# Patient Record
Sex: MALE | Race: WHITE | NOT HISPANIC OR LATINO | Employment: OTHER | URBAN - METROPOLITAN AREA
[De-identification: names, ages, dates, MRNs, and addresses within clinical notes are randomized per-mention and may not be internally consistent; named-entity substitution may affect disease eponyms.]

---

## 2023-03-17 PROBLEM — M25.552 HIP PAIN, LEFT: Status: RESOLVED | Noted: 2023-03-17 | Resolved: 2023-03-17

## 2023-03-17 PROBLEM — R00.1 SINUS BRADYCARDIA: Status: ACTIVE | Noted: 2023-03-17

## 2023-03-17 PROBLEM — F02.80 FRONTOTEMPORAL DEMENTIA (MULTI): Status: ACTIVE | Noted: 2023-03-17

## 2023-03-17 PROBLEM — F42.9 OCD (OBSESSIVE COMPULSIVE DISORDER): Status: ACTIVE | Noted: 2023-03-17

## 2023-03-17 PROBLEM — Q80.9 XERODERMA: Status: ACTIVE | Noted: 2023-03-17

## 2023-03-17 PROBLEM — G57.12 MERALGIA PARESTHETICA OF LEFT SIDE: Status: ACTIVE | Noted: 2023-03-17

## 2023-03-17 PROBLEM — L20.9 ATOPIC DERMATITIS: Status: ACTIVE | Noted: 2023-03-17

## 2023-03-17 PROBLEM — L73.9 FOLLICULITIS: Status: RESOLVED | Noted: 2023-03-17 | Resolved: 2023-03-17

## 2023-03-17 PROBLEM — E55.9 MILD VITAMIN D DEFICIENCY: Status: ACTIVE | Noted: 2023-03-17

## 2023-03-17 PROBLEM — H90.3 SENSORINEURAL HEARING LOSS, BILATERAL: Status: ACTIVE | Noted: 2023-03-17

## 2023-03-17 PROBLEM — H91.90 HEARING LOSS: Status: ACTIVE | Noted: 2023-03-17

## 2023-03-17 PROBLEM — A09 TRAVELERS' DIARRHEA: Status: RESOLVED | Noted: 2023-03-17 | Resolved: 2023-03-17

## 2023-03-17 PROBLEM — R19.5 ABNORMAL STOOL TEST: Status: ACTIVE | Noted: 2023-03-17

## 2023-03-17 PROBLEM — M85.80 OSTEOPENIA: Status: ACTIVE | Noted: 2023-03-17

## 2023-03-17 PROBLEM — G31.09 FRONTOTEMPORAL DEMENTIA (MULTI): Status: ACTIVE | Noted: 2023-03-17

## 2023-03-17 PROBLEM — E78.89 OTHER LIPOPROTEIN METABOLISM DISORDERS: Status: ACTIVE | Noted: 2023-03-17

## 2023-03-17 PROBLEM — K63.5 POLYP OF COLON: Status: ACTIVE | Noted: 2023-03-17

## 2023-03-17 PROBLEM — G31.84 MILD COGNITIVE IMPAIRMENT: Status: ACTIVE | Noted: 2023-03-17

## 2023-03-17 PROBLEM — R93.1 AGATSTON CORONARY ARTERY CALCIUM SCORE LESS THAN 100: Status: ACTIVE | Noted: 2023-03-17

## 2023-03-17 PROBLEM — L57.0 KERATOSIS: Status: ACTIVE | Noted: 2023-03-17

## 2023-03-17 PROBLEM — F22: Status: ACTIVE | Noted: 2023-03-17

## 2023-03-17 PROBLEM — E03.8 SUBCLINICAL HYPOTHYROIDISM: Status: ACTIVE | Noted: 2023-03-17

## 2023-03-17 PROBLEM — N40.0 BPH (BENIGN PROSTATIC HYPERPLASIA): Status: ACTIVE | Noted: 2023-03-17

## 2023-03-17 PROBLEM — R19.7 DIARRHEA: Status: RESOLVED | Noted: 2023-03-17 | Resolved: 2023-03-17

## 2023-03-17 PROBLEM — H93.13 BILATERAL TINNITUS: Status: ACTIVE | Noted: 2023-03-17

## 2023-03-17 PROBLEM — K52.9 GASTROENTERITIS: Status: RESOLVED | Noted: 2023-03-17 | Resolved: 2023-03-17

## 2023-03-17 PROBLEM — T69.1XXA CHILBLAINS: Status: ACTIVE | Noted: 2023-03-17

## 2023-03-17 PROBLEM — R53.83 FATIGUE: Status: ACTIVE | Noted: 2023-03-17

## 2023-03-17 PROBLEM — D17.1 LIPOMA OF BACK: Status: ACTIVE | Noted: 2023-03-17

## 2023-03-17 PROBLEM — M25.511 RIGHT SHOULDER PAIN: Status: RESOLVED | Noted: 2023-03-17 | Resolved: 2023-03-17

## 2023-03-17 PROBLEM — E78.5 HYPERLIPIDEMIA, MILD: Status: ACTIVE | Noted: 2023-03-17

## 2023-03-17 PROBLEM — R82.90 ABNORMAL URINALYSIS: Status: ACTIVE | Noted: 2023-03-17

## 2023-03-17 RX ORDER — CHOLECALCIFEROL (VITAMIN D3) 50 MCG
2000 TABLET ORAL DAILY
COMMUNITY
Start: 2018-02-27

## 2023-04-06 DIAGNOSIS — E78.5 HYPERLIPIDEMIA, MILD: ICD-10-CM

## 2023-04-06 DIAGNOSIS — E55.9 MILD VITAMIN D DEFICIENCY: ICD-10-CM

## 2023-04-06 DIAGNOSIS — Z12.5 SCREENING PSA (PROSTATE SPECIFIC ANTIGEN): ICD-10-CM

## 2023-04-06 DIAGNOSIS — E03.8 SUBCLINICAL HYPOTHYROIDISM: ICD-10-CM

## 2023-04-06 DIAGNOSIS — R82.90 ABNORMAL URINALYSIS: ICD-10-CM

## 2023-04-06 PROBLEM — M75.101 ROTATOR CUFF SYNDROME, RIGHT: Status: ACTIVE | Noted: 2023-04-06

## 2023-04-06 PROBLEM — G31.84 MILD COGNITIVE IMPAIRMENT: Status: RESOLVED | Noted: 2023-04-06 | Resolved: 2023-04-06

## 2023-04-06 RX ORDER — FLUVOXAMINE MALEATE 100 MG/1
100 TABLET, COATED ORAL 2 TIMES DAILY
COMMUNITY
Start: 2022-12-19 | End: 2023-04-26 | Stop reason: SDUPTHER

## 2023-04-11 ENCOUNTER — LAB (OUTPATIENT)
Dept: LAB | Facility: LAB | Age: 78
End: 2023-04-11
Payer: MEDICARE

## 2023-04-11 DIAGNOSIS — E55.9 MILD VITAMIN D DEFICIENCY: ICD-10-CM

## 2023-04-11 DIAGNOSIS — R82.90 ABNORMAL URINALYSIS: ICD-10-CM

## 2023-04-11 DIAGNOSIS — Z12.5 SCREENING PSA (PROSTATE SPECIFIC ANTIGEN): ICD-10-CM

## 2023-04-11 DIAGNOSIS — E03.8 SUBCLINICAL HYPOTHYROIDISM: ICD-10-CM

## 2023-04-11 DIAGNOSIS — E78.5 HYPERLIPIDEMIA, MILD: ICD-10-CM

## 2023-04-11 LAB
ALANINE AMINOTRANSFERASE (SGPT) (U/L) IN SER/PLAS: 12 U/L (ref 10–52)
ALBUMIN (G/DL) IN SER/PLAS: 4.1 G/DL (ref 3.4–5)
ANION GAP IN SER/PLAS: 14 MMOL/L (ref 10–20)
APPEARANCE, URINE: CLEAR
ASPARTATE AMINOTRANSFERASE (SGOT) (U/L) IN SER/PLAS: 16 U/L (ref 9–39)
BASOPHILS (10*3/UL) IN BLOOD BY AUTOMATED COUNT: 0.03 X10E9/L (ref 0–0.1)
BASOPHILS/100 LEUKOCYTES IN BLOOD BY AUTOMATED COUNT: 0.6 % (ref 0–2)
BILIRUBIN, URINE: NEGATIVE
BLOOD, URINE: NEGATIVE
CALCIUM (MG/DL) IN SER/PLAS: 8.8 MG/DL (ref 8.6–10.3)
CARBON DIOXIDE, TOTAL (MMOL/L) IN SER/PLAS: 27 MMOL/L (ref 21–32)
CHLORIDE (MMOL/L) IN SER/PLAS: 100 MMOL/L (ref 98–107)
CHOLESTEROL (MG/DL) IN SER/PLAS: 190 MG/DL (ref 0–199)
CHOLESTEROL IN HDL (MG/DL) IN SER/PLAS: 76.8 MG/DL
CHOLESTEROL/HDL RATIO: 2.5
COLOR, URINE: YELLOW
CREATININE (MG/DL) IN SER/PLAS: 1.04 MG/DL (ref 0.5–1.3)
EOSINOPHILS (10*3/UL) IN BLOOD BY AUTOMATED COUNT: 0.2 X10E9/L (ref 0–0.4)
EOSINOPHILS/100 LEUKOCYTES IN BLOOD BY AUTOMATED COUNT: 3.7 % (ref 0–6)
ERYTHROCYTE DISTRIBUTION WIDTH (RATIO) BY AUTOMATED COUNT: 13.8 % (ref 11.5–14.5)
ERYTHROCYTE MEAN CORPUSCULAR HEMOGLOBIN CONCENTRATION (G/DL) BY AUTOMATED: 33.5 G/DL (ref 32–36)
ERYTHROCYTE MEAN CORPUSCULAR VOLUME (FL) BY AUTOMATED COUNT: 98 FL (ref 80–100)
ERYTHROCYTES (10*6/UL) IN BLOOD BY AUTOMATED COUNT: 4.58 X10E12/L (ref 4.5–5.9)
GFR MALE: 73 ML/MIN/1.73M2
GLUCOSE (MG/DL) IN SER/PLAS: 75 MG/DL (ref 74–99)
GLUCOSE, URINE: NEGATIVE MG/DL
HEMATOCRIT (%) IN BLOOD BY AUTOMATED COUNT: 45.1 % (ref 41–52)
HEMOGLOBIN (G/DL) IN BLOOD: 15.1 G/DL (ref 13.5–17.5)
IMMATURE GRANULOCYTES/100 LEUKOCYTES IN BLOOD BY AUTOMATED COUNT: 0.2 % (ref 0–0.9)
KETONES, URINE: NEGATIVE MG/DL
LDL: 90 MG/DL (ref 0–99)
LEUKOCYTE ESTERASE, URINE: NEGATIVE
LEUKOCYTES (10*3/UL) IN BLOOD BY AUTOMATED COUNT: 5.4 X10E9/L (ref 4.4–11.3)
LYMPHOCYTES (10*3/UL) IN BLOOD BY AUTOMATED COUNT: 1.15 X10E9/L (ref 0.8–3)
LYMPHOCYTES/100 LEUKOCYTES IN BLOOD BY AUTOMATED COUNT: 21.3 % (ref 13–44)
MONOCYTES (10*3/UL) IN BLOOD BY AUTOMATED COUNT: 0.49 X10E9/L (ref 0.05–0.8)
MONOCYTES/100 LEUKOCYTES IN BLOOD BY AUTOMATED COUNT: 9.1 % (ref 2–10)
NEUTROPHILS (10*3/UL) IN BLOOD BY AUTOMATED COUNT: 3.52 X10E9/L (ref 1.6–5.5)
NEUTROPHILS/100 LEUKOCYTES IN BLOOD BY AUTOMATED COUNT: 65.1 % (ref 40–80)
NITRITE, URINE: NEGATIVE
PH, URINE: 6 (ref 5–8)
PHOSPHATE (MG/DL) IN SER/PLAS: 3.6 MG/DL (ref 2.5–4.9)
PLATELETS (10*3/UL) IN BLOOD AUTOMATED COUNT: 215 X10E9/L (ref 150–450)
POTASSIUM (MMOL/L) IN SER/PLAS: 4.3 MMOL/L (ref 3.5–5.3)
PROTEIN, URINE: NEGATIVE MG/DL
SODIUM (MMOL/L) IN SER/PLAS: 137 MMOL/L (ref 136–145)
SPECIFIC GRAVITY, URINE: 1.02 (ref 1–1.03)
THYROTROPIN (MIU/L) IN SER/PLAS BY DETECTION LIMIT <= 0.05 MIU/L: 2.84 MIU/L (ref 0.44–3.98)
TRIGLYCERIDE (MG/DL) IN SER/PLAS: 116 MG/DL (ref 0–149)
UREA NITROGEN (MG/DL) IN SER/PLAS: 25 MG/DL (ref 6–23)
UROBILINOGEN, URINE: <2 MG/DL (ref 0–1.9)
VLDL: 23 MG/DL (ref 0–40)

## 2023-04-11 PROCEDURE — 84450 TRANSFERASE (AST) (SGOT): CPT

## 2023-04-11 PROCEDURE — 80061 LIPID PANEL: CPT

## 2023-04-11 PROCEDURE — 81003 URINALYSIS AUTO W/O SCOPE: CPT

## 2023-04-11 PROCEDURE — 82306 VITAMIN D 25 HYDROXY: CPT

## 2023-04-11 PROCEDURE — 84460 ALANINE AMINO (ALT) (SGPT): CPT

## 2023-04-11 PROCEDURE — 84153 ASSAY OF PSA TOTAL: CPT

## 2023-04-11 PROCEDURE — 36415 COLL VENOUS BLD VENIPUNCTURE: CPT

## 2023-04-11 PROCEDURE — 84443 ASSAY THYROID STIM HORMONE: CPT

## 2023-04-11 PROCEDURE — 85025 COMPLETE CBC W/AUTO DIFF WBC: CPT

## 2023-04-11 PROCEDURE — 80069 RENAL FUNCTION PANEL: CPT

## 2023-04-12 LAB
CALCIDIOL (25 OH VITAMIN D3) (NG/ML) IN SER/PLAS: 31 NG/ML
PROSTATE SPECIFIC AG (NG/ML) IN SER/PLAS: 4.11 NG/ML (ref 0–4)

## 2023-04-18 ENCOUNTER — OFFICE VISIT (OUTPATIENT)
Dept: PRIMARY CARE | Facility: CLINIC | Age: 78
End: 2023-04-18
Payer: MEDICARE

## 2023-04-18 VITALS
WEIGHT: 159 LBS | RESPIRATION RATE: 16 BRPM | HEART RATE: 60 BPM | BODY MASS INDEX: 21.54 KG/M2 | SYSTOLIC BLOOD PRESSURE: 130 MMHG | HEIGHT: 72 IN | DIASTOLIC BLOOD PRESSURE: 70 MMHG

## 2023-04-18 DIAGNOSIS — M85.852 OSTEOPENIA OF NECK OF LEFT FEMUR: ICD-10-CM

## 2023-04-18 DIAGNOSIS — G57.12 MERALGIA PARESTHETICA OF LEFT SIDE: ICD-10-CM

## 2023-04-18 DIAGNOSIS — E03.8 SUBCLINICAL HYPOTHYROIDISM: ICD-10-CM

## 2023-04-18 DIAGNOSIS — H90.3 SENSORINEURAL HEARING LOSS, BILATERAL: ICD-10-CM

## 2023-04-18 DIAGNOSIS — Z00.00 ROUTINE GENERAL MEDICAL EXAMINATION AT A HEALTH CARE FACILITY: Primary | ICD-10-CM

## 2023-04-18 DIAGNOSIS — N40.1 BENIGN PROSTATIC HYPERPLASIA WITH LOWER URINARY TRACT SYMPTOMS, SYMPTOM DETAILS UNSPECIFIED: ICD-10-CM

## 2023-04-18 DIAGNOSIS — F42.9 OBSESSIVE-COMPULSIVE DISORDER, UNSPECIFIED TYPE: ICD-10-CM

## 2023-04-18 DIAGNOSIS — D17.1 LIPOMA OF BACK: ICD-10-CM

## 2023-04-18 DIAGNOSIS — F02.80 FRONTOTEMPORAL DEMENTIA (MULTI): ICD-10-CM

## 2023-04-18 DIAGNOSIS — R00.1 SINUS BRADYCARDIA: ICD-10-CM

## 2023-04-18 DIAGNOSIS — G31.09 FRONTOTEMPORAL DEMENTIA (MULTI): ICD-10-CM

## 2023-04-18 DIAGNOSIS — Q80.9 XERODERMA: ICD-10-CM

## 2023-04-18 DIAGNOSIS — F03.90 DEMENTIA WITHOUT BEHAVIORAL DISTURBANCE, PSYCHOTIC DISTURBANCE, MOOD DISTURBANCE, OR ANXIETY, UNSPECIFIED DEMENTIA SEVERITY, UNSPECIFIED DEMENTIA TYPE (MULTI): ICD-10-CM

## 2023-04-18 PROBLEM — M25.552 HIP PAIN, LEFT: Status: RESOLVED | Noted: 2023-04-18 | Resolved: 2023-04-18

## 2023-04-18 PROCEDURE — 1170F FXNL STATUS ASSESSED: CPT | Performed by: INTERNAL MEDICINE

## 2023-04-18 PROCEDURE — 1126F AMNT PAIN NOTED NONE PRSNT: CPT | Performed by: INTERNAL MEDICINE

## 2023-04-18 PROCEDURE — 99397 PER PM REEVAL EST PAT 65+ YR: CPT | Performed by: INTERNAL MEDICINE

## 2023-04-18 PROCEDURE — G0439 PPPS, SUBSEQ VISIT: HCPCS | Performed by: INTERNAL MEDICINE

## 2023-04-18 PROCEDURE — 1159F MED LIST DOCD IN RCRD: CPT | Performed by: INTERNAL MEDICINE

## 2023-04-18 PROCEDURE — 1160F RVW MEDS BY RX/DR IN RCRD: CPT | Performed by: INTERNAL MEDICINE

## 2023-04-18 PROCEDURE — 1036F TOBACCO NON-USER: CPT | Performed by: INTERNAL MEDICINE

## 2023-04-18 PROCEDURE — 99214 OFFICE O/P EST MOD 30 MIN: CPT | Performed by: INTERNAL MEDICINE

## 2023-04-18 PROCEDURE — 93000 ELECTROCARDIOGRAM COMPLETE: CPT | Performed by: INTERNAL MEDICINE

## 2023-04-18 RX ORDER — AMMONIUM LACTATE 12 G/100G
1 CREAM TOPICAL
COMMUNITY
Start: 2017-09-06

## 2023-04-18 ASSESSMENT — ACTIVITIES OF DAILY LIVING (ADL)
GROCERY_SHOPPING: INDEPENDENT
BATHING: INDEPENDENT
DRESSING: INDEPENDENT
TAKING_MEDICATION: INDEPENDENT
MANAGING_FINANCES: INDEPENDENT
DOING_HOUSEWORK: INDEPENDENT

## 2023-04-18 ASSESSMENT — ENCOUNTER SYMPTOMS
DEPRESSION: 0
OCCASIONAL FEELINGS OF UNSTEADINESS: 0

## 2023-04-18 ASSESSMENT — PATIENT HEALTH QUESTIONNAIRE - PHQ9
2. FEELING DOWN, DEPRESSED OR HOPELESS: NOT AT ALL
SUM OF ALL RESPONSES TO PHQ9 QUESTIONS 1 AND 2: 0
1. LITTLE INTEREST OR PLEASURE IN DOING THINGS: NOT AT ALL

## 2023-04-18 NOTE — PATIENT INSTRUCTIONS
Vaccines consider tetanus shot, new pneumonia shot, and new shingles shot  Continue fluvoxamine  Check bone density given borderline changes in 2019

## 2023-04-26 DIAGNOSIS — F42.9 OBSESSIVE-COMPULSIVE DISORDER, UNSPECIFIED TYPE: ICD-10-CM

## 2023-04-26 RX ORDER — FLUVOXAMINE MALEATE 100 MG/1
100 TABLET, COATED ORAL 2 TIMES DAILY
Qty: 180 TABLET | Refills: 3 | Status: SHIPPED | OUTPATIENT
Start: 2023-04-26 | End: 2023-06-26 | Stop reason: SDUPTHER

## 2023-06-26 DIAGNOSIS — F42.9 OBSESSIVE-COMPULSIVE DISORDER, UNSPECIFIED TYPE: ICD-10-CM

## 2023-06-27 RX ORDER — FLUVOXAMINE MALEATE 100 MG/1
100 TABLET, COATED ORAL 2 TIMES DAILY
Qty: 20 TABLET | Refills: 0 | Status: SHIPPED | OUTPATIENT
Start: 2023-06-27 | End: 2023-12-12

## 2023-09-01 LAB
ALANINE AMINOTRANSFERASE (SGPT) (U/L) IN SER/PLAS: 15 U/L (ref 10–52)
ALBUMIN (G/DL) IN SER/PLAS: 4.4 G/DL (ref 3.4–5)
ALKALINE PHOSPHATASE (U/L) IN SER/PLAS: 99 U/L (ref 33–136)
ANION GAP IN SER/PLAS: 14 MMOL/L (ref 10–20)
ASPARTATE AMINOTRANSFERASE (SGOT) (U/L) IN SER/PLAS: 17 U/L (ref 9–39)
BILIRUBIN TOTAL (MG/DL) IN SER/PLAS: 0.4 MG/DL (ref 0–1.2)
CALCIUM (MG/DL) IN SER/PLAS: 10 MG/DL (ref 8.6–10.6)
CARBON DIOXIDE, TOTAL (MMOL/L) IN SER/PLAS: 27 MMOL/L (ref 21–32)
CHLORIDE (MMOL/L) IN SER/PLAS: 101 MMOL/L (ref 98–107)
CREATININE (MG/DL) IN SER/PLAS: 1.18 MG/DL (ref 0.5–1.3)
GFR MALE: 63 ML/MIN/1.73M2
GLUCOSE (MG/DL) IN SER/PLAS: 82 MG/DL (ref 74–99)
LACTATE DEHYDROGENASE (U/L) IN SER/PLAS BY LAC->PYR RXN: 162 U/L (ref 84–246)
PHOSPHATE (MG/DL) IN SER/PLAS: 4 MG/DL (ref 2.5–4.9)
POTASSIUM (MMOL/L) IN SER/PLAS: 4.6 MMOL/L (ref 3.5–5.3)
PROTEIN TOTAL: 6.9 G/DL (ref 6.4–8.2)
SODIUM (MMOL/L) IN SER/PLAS: 137 MMOL/L (ref 136–145)
UREA NITROGEN (MG/DL) IN SER/PLAS: 22 MG/DL (ref 6–23)

## 2023-09-12 PROBLEM — Z00.00 ROUTINE GENERAL MEDICAL EXAMINATION AT A HEALTH CARE FACILITY: Status: ACTIVE | Noted: 2023-09-12

## 2023-09-12 PROBLEM — F03.90 DEMENTIA (MULTI): Status: ACTIVE | Noted: 2023-04-18

## 2023-09-12 ASSESSMENT — ENCOUNTER SYMPTOMS
APNEA: 0
COUGH: 0
ARTHRALGIAS: 0
SLEEP DISTURBANCE: 0
NERVOUS/ANXIOUS: 0
PALPITATIONS: 0
UNEXPECTED WEIGHT CHANGE: 0
DYSURIA: 0
FREQUENCY: 0
POLYDIPSIA: 0
CONSTIPATION: 0
BRUISES/BLEEDS EASILY: 0
VOMITING: 0
CONFUSION: 0
SPEECH DIFFICULTY: 0
TREMORS: 0
WEAKNESS: 0
DIARRHEA: 0
TROUBLE SWALLOWING: 0
VOICE CHANGE: 0
NUMBNESS: 0
FATIGUE: 0
ABDOMINAL PAIN: 0
ROS SKIN COMMENTS: COPING
DIZZINESS: 0
SORE THROAT: 0
POLYPHAGIA: 0
CARDIOVASCULAR NEGATIVE: 1
RESPIRATORY NEGATIVE: 1
ENDOCRINE NEGATIVE: 1
NAUSEA: 0
WOUND: 0
DYSPHORIC MOOD: 0
HEADACHES: 0
LIGHT-HEADEDNESS: 0
BACK PAIN: 0
FEVER: 0
EYES NEGATIVE: 1
DECREASED CONCENTRATION: 0
HEMATURIA: 0
SHORTNESS OF BREATH: 0
MUSCULOSKELETAL NEGATIVE: 1

## 2023-09-12 ASSESSMENT — PATIENT HEALTH QUESTIONNAIRE - PHQ9
2. FEELING DOWN, DEPRESSED OR HOPELESS: NOT AT ALL
1. LITTLE INTEREST OR PLEASURE IN DOING THINGS: NOT AT ALL
SUM OF ALL RESPONSES TO PHQ9 QUESTIONS 1 AND 2: 0

## 2023-09-12 NOTE — PROGRESS NOTES
Subjective   Reason for Visit: Arik Hamilton is an 78 y.o. male here for a Medicare Wellness visit.          Reviewed all medications by prescribing practitioner or clinical pharmacist (such as prescriptions, OTCs, herbal therapies and supplements) and documented in the medical record.    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   HENT:  Negative for dental problem, hearing loss, sore throat, tinnitus, trouble swallowing and voice change.         Hearing aids, dental up-to-date   Eyes: Negative.  Negative for visual disturbance.        Eyeglasses, for cataract OD, up-to-date   Respiratory: Negative.  Negative for apnea, cough and shortness of breath.    Cardiovascular: Negative.  Negative for chest pain, palpitations and leg swelling.   Gastrointestinal:  Negative for abdominal pain, constipation, diarrhea, nausea and vomiting.   Endocrine: Negative.  Negative for polydipsia, polyphagia and polyuria.   Genitourinary: Negative.  Negative for dysuria, frequency, hematuria and urgency.        On tamsulosin   Musculoskeletal: Negative.  Negative for arthralgias, back pain and gait problem.        Black cherry juice helps joints   Skin: Negative.  Negative for rash and wound.        Coping   Allergic/Immunologic: Negative for immunocompromised state.   Neurological:  Negative for dizziness, tremors, speech difficulty, weakness, light-headedness, numbness and headaches.        Dementia evaluation and study    Hematological:  Does not bruise/bleed easily.   Psychiatric/Behavioral:  Negative for confusion, decreased concentration, dysphoric mood and sleep disturbance. The patient is not nervous/anxious.        Objective   Vitals:  /70 (BP Location: Right arm, Patient Position: Sitting)   Pulse 60   Resp 16   Ht 1.829 m (6')   Wt 72.1 kg (159 lb)   BMI 21.56 kg/m²       Physical Exam  Constitutional:       General: He is not in acute distress.     Appearance: Normal appearance. He is normal weight.   HENT:      Head: Normocephalic.      Right Ear: Hearing and tympanic membrane normal.      Left Ear: Hearing and tympanic membrane normal.      Ears:      Comments: Speech and finger rub     Nose: Nose normal.      Mouth/Throat:      Mouth: Mucous membranes are moist.      Pharynx: Oropharynx is clear.   Eyes:      General: No scleral icterus.     Extraocular Movements: Extraocular movements intact.      Conjunctiva/sclera: Conjunctivae normal.   Neck:      Thyroid: No thyromegaly.      Vascular: No carotid bruit or JVD.      Comments: No JVD or thyromegaly  Cardiovascular:      Rate and Rhythm: Normal rate and regular rhythm.      Pulses: Normal pulses.      Heart sounds: Normal heart sounds. No murmur heard.  Pulmonary:      Effort: Pulmonary effort is normal.      Breath sounds: Normal breath sounds. No wheezing, rhonchi or rales.   Abdominal:      General: Abdomen is flat. Bowel sounds are normal. There is no distension.      Palpations: Abdomen is soft. There is no mass.      Tenderness: There is no abdominal tenderness. There is no right CVA tenderness, left CVA tenderness or guarding.      Hernia: No hernia is present.      Comments: Right lower quadrant surgical scar   Genitourinary:     Penis: Normal.       Testes: Normal.      Prostate: Normal.      Rectum: Normal.      Comments: Circumcised  Musculoskeletal:         General: Normal range of motion.      Cervical back: Full passive range of motion without  pain and normal range of motion. No tenderness.      Right lower leg: No edema.      Left lower leg: No edema.      Comments: Joints F ROM, T0 L0 S0  Scoliosis mild   Lymphadenopathy:      Cervical: No cervical adenopathy.   Skin:     General: Skin is warm.      Capillary Refill: Capillary refill takes less than 2 seconds.      Findings: No lesion or rash.      Comments: Soft tissue masses right posterior chest 2 cm nontender mobile smooth round   Neurological:      General: No focal deficit present.      Mental Status: He is alert and oriented to person, place, and time.      Cranial Nerves: No cranial nerve deficit.      Sensory: Sensory deficit present.      Motor: No weakness.      Coordination: Coordination normal.      Gait: Gait normal.      Deep Tendon Reflexes: Reflexes normal.      Comments: Decreased sensation left lateral thigh   Psychiatric:         Mood and Affect: Mood normal.         Behavior: Behavior normal.         Thought Content: Thought content normal.     Data   Electrocardiogram sinus bradycardia  Lab 4/11/2023 reviewed, normal, no HDL cholesterol 76, PSA 4.11  PSA 3.6 on 9/6/2019  MRI brain 9/22/2021, 4/29/2022  Coronary artery calcium CT score 3/1/2018 equals 0  Chest x-ray 3/1/2018  Bone densitometry 9/20/2019-osteopenia  Colonoscopy 9/25/2018 polyps    Assessment/Plan     Overall stable on current therapy  obsessive-compulsive disorder/dementia on study therapy  Sinus bradycardia stable, cardiac risk counseling provided noting elevated HDL cholesterol and favorable coronary artery calcium CT score  Age and gender cancer screening and prevention reviewed  Immunizations reviewed  Osteopenia reviewed, recheck bone densitometry  Problem List Items Addressed This Visit       OCD (obsessive compulsive disorder)    BPH (benign prostatic hyperplasia)    Frontotemporal dementia (CMS/HCC)    Lipoma of back    Meralgia paresthetica of left side    Osteopenia    Relevant Orders    XR DEXA bone  density    Sensorineural hearing loss, bilateral    Sinus bradycardia    Relevant Orders    ECG 12 lead (Clinic Performed) (Completed)    Subclinical hypothyroidism    Xeroderma    Dementia (CMS/HCC)    Routine general medical examination at a health care facility - Primary

## 2023-12-09 DIAGNOSIS — F42.9 OBSESSIVE-COMPULSIVE DISORDER, UNSPECIFIED TYPE: ICD-10-CM

## 2023-12-12 RX ORDER — FLUVOXAMINE MALEATE 100 MG/1
100 TABLET, COATED ORAL 2 TIMES DAILY
Qty: 180 TABLET | Refills: 1 | Status: SHIPPED | OUTPATIENT
Start: 2023-12-12 | End: 2024-02-12 | Stop reason: SDUPTHER

## 2024-01-17 ENCOUNTER — APPOINTMENT (OUTPATIENT)
Dept: NEUROLOGY | Facility: CLINIC | Age: 79
End: 2024-01-17
Payer: MEDICARE

## 2024-01-17 NOTE — PROGRESS NOTES
"Start Time:***  Stop Time:***    Chart reviewed, including physician notes and diagnostic studies, for 3 minutes prior to this appointment.     Accompanied by: ***    Following the patient's last visit, they were to increase fluvoxamine and wear hearing aids.  I last saw him in 2023.      Allergies   Allergen Reactions    Pollen Extracts Other     Runny nose         Current Outpatient Medications:     ammonium lactate (Amlactin) 12 % cream, Apply 1 Application topically., Disp: , Rfl:     cholecalciferol (Vitamin D-3) 50 MCG (2000 UT) tablet, Take 1 tablet (2,000 Units) by mouth once daily., Disp: , Rfl:     fLuvoxaMINE (Luvox) 100 mg tablet, TAKE 1 TABLET BY MOUTH TWICE A DAY, Disp: 180 tablet, Rfl: 1    tamsulosin HCl (TAMSULOSIN ORAL), Take 0.4 mg by mouth once daily., Disp: , Rfl:     Review of Systems  As per HPI, otherwise all other systems have been reviewed are negative for complaint.      Objective   There were no vitals taken for this visit.    EXAMINATION  Mental Status Examination  General appearance: {Groomin}, {Eye Contact:7191513958} eye contact, {COOPERATIVE/UNCOOPERATIVE:19403}  Orientation: {EXAM; NEURO ALERTNESS:33181} and oriented to {POD ORIENTED:}  Motor: {Psychomotoragit:86516}, gait is {BSA Gait:33884}  Speech: {SPEECH:03070}  Mood: {mood:61704}  Affect: { Psych Affect:614938}  Passive death wish: {YES-DESCRIBE/NO:97525}  Suicidal ideation: {YES-DESCRIBE/NO:54933}  Thought process: {bsathoughtprocess:31157}  Thought content: Hallucinations:{Hallucinations y/n:0070014440::\"no\"}, Delusions: {CoCM Hallucination/Delusions:32606}; and {bsainternalstimuli:62355}  Insight/Judgment: {GOOD/FAIR/POOR (Optional):90922}/{GOOD/FAIR/POOR (Optional):94652}  Fund of knowledge: {GOOD/FAIR/POOR (Optional):16816}  Recent and remote memory: {GOOD/FAIR/POOR (Optional):98897}/{GOOD/FAIR/POOR (Optional):95520}  Attention span and concentration: {GOOD/FAIR/POOR (Optional):13510}  Language: " "{bsalanguage:53743}    MoCA ( 07/12/2023): 24/30 (-3 visuospatial/executive, -1 attention, -1 delayed recall, -1 orientation)  \"f\"=12 words  MIS: 14/15     MoCA (04/27/2022) : 24/30 (missing -3 visuospatial/executive, -3 delayed recall )  \"f\"= [11], \"animals\"= [10]  Memory Index Score (MIS): 9/15       NEUROLOGICAL EXAMINATION  {NEURO CRANIAL NERVES:85809}    Motor:  Muscle bulk was {NORMAL/ABNORMAL:92717}  {STRENGTH:36264}  Finger taps and hand opening: {NORMAL/ABNORMAL:12268}  Toe and heel taps: {NORMAL/ABNORMAL:38961}  Muscular tone: {NORMAL/ABNORMAL:65080}  Movements: {NORMAL/ABNORMAL:69782}    Reflexes:  {Reflexes:77530}           Nataly's reflex: {NORMAL/ABNORMAL:47709}  Frontal release signs: {bsafrontalsigns:10707}    Coordination: {EXAM - COORDINATION:11112:a}    Gait:  Able to stand from seated position with arms across chest: {NORMAL/ABNORMAL:23280}  {BSA Gait:49141}    Sensory  Romberg's sign: {POSITIVE/NEGATIVE/NOT DONE:51707}    Formulation: Mr. PARMAR is a very pleasant 79 year old, , left-handed, man with an extensive family history of frontotemporal dementia and amyotrophic lateral sclerosis and a personal history of frontotemporal dementia ([id number]) and delusional parasitosis who presents for a follow-up visit.    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

## 2024-02-12 DIAGNOSIS — F42.9 OBSESSIVE-COMPULSIVE DISORDER, UNSPECIFIED TYPE: ICD-10-CM

## 2024-02-12 RX ORDER — FLUVOXAMINE MALEATE 100 MG/1
100 TABLET, COATED ORAL 2 TIMES DAILY
Qty: 180 TABLET | Refills: 1 | Status: SHIPPED | OUTPATIENT
Start: 2024-02-12 | End: 2024-02-21 | Stop reason: SDUPTHER

## 2024-02-21 DIAGNOSIS — F42.9 OBSESSIVE-COMPULSIVE DISORDER, UNSPECIFIED TYPE: ICD-10-CM

## 2024-02-21 RX ORDER — FLUVOXAMINE MALEATE 100 MG/1
100 TABLET, COATED ORAL 2 TIMES DAILY
Qty: 180 TABLET | Refills: 3 | Status: SHIPPED | OUTPATIENT
Start: 2024-02-21 | End: 2024-05-02 | Stop reason: SDUPTHER

## 2024-03-27 ENCOUNTER — OFFICE VISIT (OUTPATIENT)
Dept: NEUROLOGY | Facility: CLINIC | Age: 79
End: 2024-03-27
Payer: MEDICARE

## 2024-03-27 VITALS
DIASTOLIC BLOOD PRESSURE: 76 MMHG | HEART RATE: 76 BPM | SYSTOLIC BLOOD PRESSURE: 106 MMHG | BODY MASS INDEX: 22.61 KG/M2 | OXYGEN SATURATION: 98 % | WEIGHT: 166.7 LBS

## 2024-03-27 DIAGNOSIS — F22: ICD-10-CM

## 2024-03-27 DIAGNOSIS — H91.90 HEARING LOSS, UNSPECIFIED HEARING LOSS TYPE, UNSPECIFIED LATERALITY: ICD-10-CM

## 2024-03-27 DIAGNOSIS — G31.09 FRONTOTEMPORAL DEMENTIA (MULTI): Primary | ICD-10-CM

## 2024-03-27 DIAGNOSIS — F02.80 FRONTOTEMPORAL DEMENTIA (MULTI): Primary | ICD-10-CM

## 2024-03-27 PROCEDURE — 1159F MED LIST DOCD IN RCRD: CPT | Performed by: PSYCHIATRY & NEUROLOGY

## 2024-03-27 PROCEDURE — 99215 OFFICE O/P EST HI 40 MIN: CPT | Performed by: PSYCHIATRY & NEUROLOGY

## 2024-03-27 RX ORDER — LAMIVUDINE 300 MG/1
300 TABLET, FILM COATED ORAL
COMMUNITY
Start: 2024-02-13 | End: 2025-02-12

## 2024-03-27 RX ORDER — ALFUZOSIN HYDROCHLORIDE 10 MG/1
10 TABLET, EXTENDED RELEASE ORAL NIGHTLY
COMMUNITY

## 2024-03-27 ASSESSMENT — PATIENT HEALTH QUESTIONNAIRE - PHQ9
1. LITTLE INTEREST OR PLEASURE IN DOING THINGS: NOT AT ALL
SUM OF ALL RESPONSES TO PHQ9 QUESTIONS 1 AND 2: 0
2. FEELING DOWN, DEPRESSED OR HOPELESS: NOT AT ALL

## 2024-03-27 ASSESSMENT — ENCOUNTER SYMPTOMS
LOSS OF SENSATION IN FEET: 0
OCCASIONAL FEELINGS OF UNSTEADINESS: 0
DEPRESSION: 0

## 2024-03-27 ASSESSMENT — PAIN - FUNCTIONAL ASSESSMENT: PAIN_FUNCTIONAL_ASSESSMENT: 0-10

## 2024-03-27 ASSESSMENT — PAIN SCALES - GENERAL: PAINLEVEL_OUTOF10: 0 - NO PAIN

## 2024-03-27 NOTE — PATIENT INSTRUCTIONS
- get driving evaluation  - wear hearing aids regularly  - continue current medications  - follow-up with me in about 6 months with repeat MoCA beforehand    General brain health guidelines:  - make sure your other medical conditions are well controlled (e.g., high blood pressure, high cholesterol, diabetes, sleep apnea etc)  - do not smoke or chew tobacco  - address any sensory deficits (e.g., proper glasses for poor eyesight, hearing aids for hearing loss)  - use a weekly pill box  - eat a heart healthy diet (e.g., lots of fruits and vegetables, low fat and cholesterol)  - exercise at least four days per week, 30 minutes at a time at an intensity that would make it difficult to converse with someone   - make sure you are getting at least 7 hours of quality sleep per night  - keep yourself mentally active daily by reading, playing cards, doing word searches, puzzles, etc.  - stay socially active by being part of a group or organization    Please note that the above may not be an entirely accurate or complete list of your medications, allergies, past medical history, past surgical history, or active problems as the document is completed following your visit.

## 2024-03-27 NOTE — PROGRESS NOTES
Start Time:2:45pm  Stop Time:3:25pm    Chart reviewed, including physician notes and diagnostic studies, for 2 minutes prior to this appointment.     Accompanied by: wife    Formulation: Mr. PARMAR is a very pleasant 79 year old, , left-handed, man with an extensive family history of frontotemporal dementia and amyotrophic lateral sclerosis and a personal history of frontotemporal dementia ([id number]) and delusional parasitosis who presents for a follow-up visit. He and his wife think he received benefit from an experimental treatment trial he was in, the compound of which is not available to him.  However, he is on something similar.  He is very slowly progressive and there are no new symptoms.    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  He was on an investigational antiviral medication for C9 carriers.  He was in a study for it and feels like it is helpful.  Still obsesses about skin and its dryness.  Mood is good.  Sleeping okay.  Appetite is fair.  No passive death wish or suicidal ideation.  No hallucinations.  He has problems hearing, but he is not wearing his hearing aids.  He has difficulty remembering people's names.  He is still driving.  His wife insists on driving saying that he drives slowly and sometimes veers around.  He has not had any accidents.  No overt motor changes.  His wife thinks that his hands shake more.  He has forgotten to renew auto stickers and insurance for some property he owns.  He continues to demonstrate loss of insight into some of his socially inappropriate comments.  There are  "no motor symptoms.    Allergies   Allergen Reactions    Pollen Extracts Other     Runny nose         Current Outpatient Medications:     alfuzosin (Uroxatral) 10 mg 24 hr tablet, Take 1 tablet (10 mg) by mouth once daily at bedtime., Disp: , Rfl:     ammonium lactate (Amlactin) 12 % cream, Apply 1 Application topically., Disp: , Rfl:     cholecalciferol (Vitamin D-3) 50 MCG (2000 UT) tablet, Take 1 tablet (2,000 Units) by mouth once daily., Disp: , Rfl:     fLuvoxaMINE (Luvox) 100 mg tablet, Take 1 tablet (100 mg) by mouth 2 times a day., Disp: 180 tablet, Rfl: 3    lamiVUDine (Epivir) 300 mg tablet, Take 1 tablet (300 mg) by mouth once daily., Disp: , Rfl:     tamsulosin HCl (TAMSULOSIN ORAL), Take 0.4 mg by mouth once daily., Disp: , Rfl:     Review of Systems  As per HPI, otherwise all other systems have been reviewed are negative for complaint.      Objective   /76   Pulse 76   Wt 75.6 kg (166 lb 11.2 oz)   SpO2 98%   BMI 22.61 kg/m²     EXAMINATION  Mental Status Examination:  General appearance: Well-groomed, good eye contact, cooperative  Orientation: Alert and oriented to person, place, and time  Motor: no psychomotor agitation or retardation, stable gait  Speech: regular rate, rhythm, tone, and volume  Mood: \"good\"  Affect: stable, full range, with brightening, and mood congruent  Passive death wish: denies  Suicidal ideation: denies  Thought process: tangential and scattered at times without loosening of associations, problems maintaining focus and concentration  Thought content: no hallucinations, + delusional parasitosis, and not responding to internal stimuli  Insight/Judgment: poor/poor  Fund of knowledge: good  Recent and remote memory: fair/good  Attention span and concentration: poor  Language: normal receptive and expressive language without paraphrasic errors  Occasionally makes socially inappropriate comments    Reg: 3/3    Season: spring  Year: 2023 x  Month: March  Date: 27th " "x    President: Bridger Dorman    State: [place]  City: [place]  Place: [place]  Floor: [address]  Street: [facility]    Recall: 2/3 (3/3)    Remembered 4/5 of MoCA items and 5/5 with cues       MoCA ( 07/12/2023): 24/30 (-3 visuospatial/executive, -1 attention, -1 delayed recall, -1 orientation)  \"f\"=12 words  MIS: 14/15     MoCA (04/27/2022) : 24/30 (missing -3 visuospatial/executive, -3 delayed recall )  \"f\"= [11], \"animals\"= [10]  Memory Index Score (MIS): 9/15   "

## 2024-04-27 ENCOUNTER — PATIENT MESSAGE (OUTPATIENT)
Dept: PRIMARY CARE | Facility: CLINIC | Age: 79
End: 2024-04-27
Payer: MEDICARE

## 2024-04-27 DIAGNOSIS — F42.9 OBSESSIVE-COMPULSIVE DISORDER, UNSPECIFIED TYPE: ICD-10-CM

## 2024-05-02 RX ORDER — FLUVOXAMINE MALEATE 100 MG/1
100 TABLET, COATED ORAL 2 TIMES DAILY
Qty: 180 TABLET | Refills: 3 | Status: SHIPPED | OUTPATIENT
Start: 2024-05-02 | End: 2025-05-02

## 2024-07-17 ENCOUNTER — APPOINTMENT (OUTPATIENT)
Dept: PRIMARY CARE | Facility: CLINIC | Age: 79
End: 2024-07-17
Payer: MEDICARE

## 2024-07-17 VITALS — RESPIRATION RATE: 14 BRPM | DIASTOLIC BLOOD PRESSURE: 60 MMHG | HEART RATE: 60 BPM | SYSTOLIC BLOOD PRESSURE: 124 MMHG

## 2024-07-17 DIAGNOSIS — G31.09 FRONTOTEMPORAL DEMENTIA (MULTI): ICD-10-CM

## 2024-07-17 DIAGNOSIS — R06.09 EXERTIONAL DYSPNEA: Primary | ICD-10-CM

## 2024-07-17 DIAGNOSIS — F42.9 OBSESSIVE-COMPULSIVE DISORDER, UNSPECIFIED TYPE: ICD-10-CM

## 2024-07-17 DIAGNOSIS — R00.1 SINUS BRADYCARDIA: ICD-10-CM

## 2024-07-17 DIAGNOSIS — F02.80 FRONTOTEMPORAL DEMENTIA (MULTI): ICD-10-CM

## 2024-07-17 PROCEDURE — 99214 OFFICE O/P EST MOD 30 MIN: CPT | Performed by: INTERNAL MEDICINE

## 2024-07-17 NOTE — PROGRESS NOTES
"Subjective   Patient ID: Arik Hamilton is a 79 y.o. male who presents for Follow-up.  HPI  Memory stable   OCD stable  \"Loss of wind\" short of breath when swim 20 lengths prior exercise calisthenitiscs   Flight of ideas - history   Note reason for visit left knee problem-not mentioned    Review of Systems   HENT:  Negative for sore throat.    Respiratory:  Positive for shortness of breath. Negative for chest tightness.    Cardiovascular: Negative.    Musculoskeletal:  Positive for arthralgias.        Knee pain   Neurological: Negative.    Psychiatric/Behavioral:  Positive for decreased concentration. Negative for confusion and dysphoric mood. The patient is nervous/anxious.        Objective   Physical Exam  Constitutional:       General: He is not in acute distress.  HENT:      Mouth/Throat:      Mouth: Mucous membranes are moist.      Pharynx: Oropharynx is clear.   Eyes:      Conjunctiva/sclera: Conjunctivae normal.   Neck:      Comments: No JVD  Cardiovascular:      Rate and Rhythm: Normal rate and regular rhythm.      Pulses: Normal pulses.      Heart sounds: Normal heart sounds.   Pulmonary:      Effort: Pulmonary effort is normal.      Breath sounds: Normal breath sounds.   Abdominal:      General: Abdomen is flat. Bowel sounds are normal. There is no distension.      Palpations: Abdomen is soft. There is no mass.      Tenderness: There is no abdominal tenderness.   Musculoskeletal:      Right lower leg: No edema.      Left lower leg: No edema.      Comments: Lower extremity joints intact, nonacute   Neurological:      General: No focal deficit present.      Mental Status: He is alert.   Psychiatric:         Mood and Affect: Mood normal.      Comments: Talkative, changing subject frequently       /60   Pulse 60   Resp 14     Data  Lab Free PSA 3.7 11/14/2023  Lab 4 /11/23 satisfactory  Electrocardiogram 4/18/23 normal sinus rhythm  CSF studies 5/10/2022  MRI brain 4/29/2022 moderate diffuse parenchymal " volume loss with mild chronic ischemic changes  [facility] neurology 5/31/2024-compatible with frontal temporal dementia related to genetic testing; MoCA 22/30  Lamivudine    Assessment/Plan     Resting echocardiogram to check heart size, structure and function   Clinically physically active for age without overt cardiopulmonary problem  Problem List Items Addressed This Visit       OCD (obsessive compulsive disorder)    Frontotemporal dementia (Multi)    Sinus bradycardia    Exertional dyspnea - Primary    Relevant Orders    Transthoracic Echo (TTE) Complete

## 2024-08-10 ASSESSMENT — ENCOUNTER SYMPTOMS
SORE THROAT: 0
SHORTNESS OF BREATH: 1
DECREASED CONCENTRATION: 1
CHEST TIGHTNESS: 0
NERVOUS/ANXIOUS: 1
CARDIOVASCULAR NEGATIVE: 1
NEUROLOGICAL NEGATIVE: 1
DYSPHORIC MOOD: 0
ARTHRALGIAS: 1
CONFUSION: 0

## 2025-02-28 ENCOUNTER — APPOINTMENT (OUTPATIENT)
Dept: PRIMARY CARE | Facility: CLINIC | Age: 80
End: 2025-02-28
Payer: MEDICARE

## 2025-03-31 ENCOUNTER — APPOINTMENT (OUTPATIENT)
Dept: PRIMARY CARE | Facility: CLINIC | Age: 80
End: 2025-03-31
Payer: MEDICARE

## 2025-03-31 VITALS
OXYGEN SATURATION: 98 % | DIASTOLIC BLOOD PRESSURE: 73 MMHG | SYSTOLIC BLOOD PRESSURE: 124 MMHG | WEIGHT: 166 LBS | HEART RATE: 67 BPM | HEIGHT: 72 IN | RESPIRATION RATE: 18 BRPM | BODY MASS INDEX: 22.48 KG/M2

## 2025-03-31 DIAGNOSIS — F02.80 FRONTOTEMPORAL DEMENTIA: ICD-10-CM

## 2025-03-31 DIAGNOSIS — M75.101 ROTATOR CUFF SYNDROME, RIGHT: Primary | ICD-10-CM

## 2025-03-31 DIAGNOSIS — G31.09 FRONTOTEMPORAL DEMENTIA: ICD-10-CM

## 2025-03-31 DIAGNOSIS — F42.9 OBSESSIVE-COMPULSIVE DISORDER, UNSPECIFIED TYPE: ICD-10-CM

## 2025-03-31 PROCEDURE — 99214 OFFICE O/P EST MOD 30 MIN: CPT | Performed by: INTERNAL MEDICINE

## 2025-03-31 PROCEDURE — 1159F MED LIST DOCD IN RCRD: CPT | Performed by: INTERNAL MEDICINE

## 2025-03-31 ASSESSMENT — ENCOUNTER SYMPTOMS
DEPRESSION: 0
LOSS OF SENSATION IN FEET: 0
OCCASIONAL FEELINGS OF UNSTEADINESS: 0

## 2025-03-31 NOTE — PROGRESS NOTES
Subjective   Patient ID: Arik Hamilton is a 80 y.o. male who presents for Right Shoulder Pain.  HPI  Healthy   Cancer cure:  water, sun, cardioexercise  Can't keep hands feet warm   Mental illness skin dry - therapist   Missed appt attractive nurse   Recycling issues  Right shoulder pain decreased range of motion without acute injury redness swelling bruising, weakness numbness tingling Ptx 2020 successful  Backstroke / breaststroke     Review of Systems   Constitutional: Negative.    Respiratory: Negative.     Cardiovascular: Negative.    Musculoskeletal:  Positive for arthralgias.        Right shoulder as HPI   Neurological: Negative.        Objective   Physical Exam  Constitutional:       General: He is not in acute distress.  Cardiovascular:      Rate and Rhythm: Normal rate and regular rhythm.      Heart sounds: Normal heart sounds.   Pulmonary:      Effort: Pulmonary effort is normal.      Breath sounds: Normal breath sounds.   Musculoskeletal:         General: No swelling, tenderness or deformity.      Comments: Upper extremity joints T0 L0 S0, F ROM, intact pulses  Mild discomfort on right shoulder external rotation and abduction   Skin:     Findings: No rash.   Neurological:      Mental Status: He is alert.      Sensory: No sensory deficit.      Motor: No weakness.      Comments: Upper extremity intact   Psychiatric:         Mood and Affect: Mood normal.      Comments: Wandering subject conversation       /73 (BP Location: Right arm, Patient Position: Sitting)   Pulse 67   Resp 18   Ht 1.829 m (6')   Wt 75.3 kg (166 lb)   SpO2 98%   BMI 22.51 kg/m²     Data  CT chest 2/5/2025?  Right upper lobe nodule or inflammation, recheck 3 to 6 months  Nuclear stress test 2/12/2025-no ischemia, normal function  MRI brain 4/29/2022-moderate diffuse parenchymal volume loss with mild chronic ischemic changes  X-ray right shoulder 5/13/2020 minimal DJD      Assessment/Plan   Rotator cuff syndrome right-mild  reassurance, physical therapy, conservative care  Dementia, microvascular cerebrovascular disease    Problem List Items Addressed This Visit       OCD (obsessive compulsive disorder)    Frontotemporal dementia    Rotator cuff syndrome, right - Primary

## 2025-04-12 ASSESSMENT — ENCOUNTER SYMPTOMS
ARTHRALGIAS: 1
NEUROLOGICAL NEGATIVE: 1
CONSTITUTIONAL NEGATIVE: 1
CARDIOVASCULAR NEGATIVE: 1
RESPIRATORY NEGATIVE: 1

## 2025-05-25 DIAGNOSIS — F42.9 OBSESSIVE-COMPULSIVE DISORDER, UNSPECIFIED TYPE: ICD-10-CM

## 2025-05-27 RX ORDER — FLUVOXAMINE MALEATE 100 MG/1
100 TABLET, COATED ORAL 2 TIMES DAILY
Qty: 180 TABLET | Refills: 3 | Status: SHIPPED | OUTPATIENT
Start: 2025-05-27